# Patient Record
Sex: FEMALE | Employment: UNEMPLOYED | URBAN - METROPOLITAN AREA
[De-identification: names, ages, dates, MRNs, and addresses within clinical notes are randomized per-mention and may not be internally consistent; named-entity substitution may affect disease eponyms.]

---

## 2020-05-14 LAB — GRBS, EXTERNAL: NORMAL

## 2020-11-05 LAB
ANTIBODY SCREEN, EXTERNAL: NORMAL
CHLAMYDIA, EXTERNAL: NORMAL
HBSAG, EXTERNAL: NORMAL
HIV, EXTERNAL: NORMAL
N. GONORRHEA, EXTERNAL: NORMAL
RPR, EXTERNAL: NORMAL
RUBELLA, EXTERNAL: NORMAL
TYPE, ABO & RH, EXTERNAL: NORMAL

## 2021-05-15 ENCOUNTER — HOSPITAL ENCOUNTER (INPATIENT)
Age: 26
LOS: 2 days | Discharge: HOME | End: 2021-05-17
Attending: OBSTETRICS & GYNECOLOGY | Admitting: OBSTETRICS & GYNECOLOGY
Payer: OTHER GOVERNMENT

## 2021-05-15 PROBLEM — O99.119 THROMBOCYTOPENIA AFFECTING PREGNANCY, ANTEPARTUM (HCC): Status: ACTIVE | Noted: 2021-05-15

## 2021-05-15 PROBLEM — O13.9 GESTATIONAL HYPERTENSION AFFECTING THIRD PREGNANCY: Status: ACTIVE | Noted: 2021-05-15

## 2021-05-15 PROBLEM — D69.6 THROMBOCYTOPENIA AFFECTING PREGNANCY, ANTEPARTUM (HCC): Status: ACTIVE | Noted: 2021-05-15

## 2021-05-15 PROBLEM — Z3A.39 39 WEEKS GESTATION OF PREGNANCY: Status: ACTIVE | Noted: 2021-05-15

## 2021-05-15 LAB
ABO + RH BLD: NORMAL
ALBUMIN SERPL-MCNC: 2.9 G/DL (ref 3.4–5)
ALBUMIN/GLOB SERPL: 0.9 {RATIO} (ref 0.8–1.7)
ALP SERPL-CCNC: 138 U/L (ref 45–117)
ALT SERPL-CCNC: 11 U/L (ref 13–56)
ANION GAP SERPL CALC-SCNC: 6 MMOL/L (ref 3–18)
APPEARANCE UR: NORMAL
AST SERPL-CCNC: 12 U/L (ref 10–38)
BILIRUB SERPL-MCNC: 0.3 MG/DL (ref 0.2–1)
BILIRUB UR QL: NEGATIVE
BLOOD GROUP ANTIBODIES SERPL: NORMAL
BUN SERPL-MCNC: 6 MG/DL (ref 7–18)
BUN/CREAT SERPL: 11 (ref 12–20)
CALCIUM SERPL-MCNC: 9 MG/DL (ref 8.5–10.1)
CHLORIDE SERPL-SCNC: 106 MMOL/L (ref 100–111)
CO2 SERPL-SCNC: 25 MMOL/L (ref 21–32)
COLOR UR: NORMAL
CREAT SERPL-MCNC: 0.53 MG/DL (ref 0.6–1.3)
ERYTHROCYTE [DISTWIDTH] IN BLOOD BY AUTOMATED COUNT: 14.6 % (ref 11.6–14.5)
GLOBULIN SER CALC-MCNC: 3.4 G/DL (ref 2–4)
GLUCOSE SERPL-MCNC: 87 MG/DL (ref 74–99)
GLUCOSE UR QL STRIP.AUTO: NEGATIVE MG/DL
HCT VFR BLD AUTO: 37.9 % (ref 35–45)
HGB BLD-MCNC: 12.2 G/DL (ref 12–16)
KETONES UR-MCNC: NEGATIVE MG/DL
LDH SERPL L TO P-CCNC: 184 U/L (ref 81–234)
LEUKOCYTE ESTERASE UR QL STRIP: NEGATIVE
MCH RBC QN AUTO: 27.2 PG (ref 24–34)
MCHC RBC AUTO-ENTMCNC: 32.2 G/DL (ref 31–37)
MCV RBC AUTO: 84.6 FL (ref 74–97)
NITRITE UR QL: NEGATIVE
PH UR: 7 [PH] (ref 5–9)
PLATELET # BLD AUTO: 146 K/UL (ref 135–420)
PMV BLD AUTO: 12 FL (ref 9.2–11.8)
POTASSIUM SERPL-SCNC: 3.7 MMOL/L (ref 3.5–5.5)
PROT SERPL-MCNC: 6.3 G/DL (ref 6.4–8.2)
PROT UR QL: NEGATIVE MG/DL
RBC # BLD AUTO: 4.48 M/UL (ref 4.2–5.3)
RBC # UR STRIP: NEGATIVE /UL
SERVICE CMNT-IMP: NORMAL
SODIUM SERPL-SCNC: 137 MMOL/L (ref 136–145)
SP GR UR: 1.02 (ref 1–1.02)
SPECIMEN EXP DATE BLD: NORMAL
URATE SERPL-MCNC: 4.2 MG/DL (ref 2.6–7.2)
UROBILINOGEN UR QL: 0.2 EU/DL (ref 0.2–1)
WBC # BLD AUTO: 8.5 K/UL (ref 4.6–13.2)

## 2021-05-15 PROCEDURE — 85027 COMPLETE CBC AUTOMATED: CPT

## 2021-05-15 PROCEDURE — 65270000029 HC RM PRIVATE

## 2021-05-15 PROCEDURE — 74011250636 HC RX REV CODE- 250/636: Performed by: ADVANCED PRACTICE MIDWIFE

## 2021-05-15 PROCEDURE — 81003 URINALYSIS AUTO W/O SCOPE: CPT

## 2021-05-15 PROCEDURE — 59025 FETAL NON-STRESS TEST: CPT

## 2021-05-15 PROCEDURE — 83615 LACTATE (LD) (LDH) ENZYME: CPT

## 2021-05-15 PROCEDURE — 36415 COLL VENOUS BLD VENIPUNCTURE: CPT

## 2021-05-15 PROCEDURE — 80053 COMPREHEN METABOLIC PANEL: CPT

## 2021-05-15 PROCEDURE — 99284 EMERGENCY DEPT VISIT MOD MDM: CPT

## 2021-05-15 PROCEDURE — 84550 ASSAY OF BLOOD/URIC ACID: CPT

## 2021-05-15 PROCEDURE — 86901 BLOOD TYPING SEROLOGIC RH(D): CPT

## 2021-05-15 RX ORDER — BUTORPHANOL TARTRATE 2 MG/ML
2 INJECTION INTRAMUSCULAR; INTRAVENOUS
Status: DISCONTINUED | OUTPATIENT
Start: 2021-05-15 | End: 2021-05-16 | Stop reason: HOSPADM

## 2021-05-15 RX ORDER — NALBUPHINE HYDROCHLORIDE 10 MG/ML
10 INJECTION, SOLUTION INTRAMUSCULAR; INTRAVENOUS; SUBCUTANEOUS
Status: DISCONTINUED | OUTPATIENT
Start: 2021-05-15 | End: 2021-05-16 | Stop reason: HOSPADM

## 2021-05-15 RX ORDER — METHYLERGONOVINE MALEATE 0.2 MG/ML
0.2 INJECTION INTRAVENOUS AS NEEDED
Status: DISCONTINUED | OUTPATIENT
Start: 2021-05-15 | End: 2021-05-16 | Stop reason: HOSPADM

## 2021-05-15 RX ORDER — MINERAL OIL
30 OIL (ML) ORAL AS NEEDED
Status: DISCONTINUED | OUTPATIENT
Start: 2021-05-15 | End: 2021-05-16 | Stop reason: HOSPADM

## 2021-05-15 RX ORDER — OXYTOCIN/RINGER'S LACTATE 30/500 ML
10 PLASTIC BAG, INJECTION (ML) INTRAVENOUS AS NEEDED
Status: DISCONTINUED | OUTPATIENT
Start: 2021-05-15 | End: 2021-05-15 | Stop reason: SDUPTHER

## 2021-05-15 RX ORDER — OXYTOCIN/RINGER'S LACTATE 30/500 ML
87.3 PLASTIC BAG, INJECTION (ML) INTRAVENOUS AS NEEDED
Status: DISCONTINUED | OUTPATIENT
Start: 2021-05-15 | End: 2021-05-15 | Stop reason: SDUPTHER

## 2021-05-15 RX ORDER — VITAMIN A ACETATE, BETA CAROTENE, ASCORBIC ACID, CHOLECALCIFEROL, .ALPHA.-TOCOPHEROL ACETATE, DL-, THIAMINE MONONITRATE, RIBOFLAVIN, NIACINAMIDE, PYRIDOXINE HYDROCHLORIDE, FOLIC ACID, CYANOCOBALAMIN, CALCIUM CARBONATE, FERROUS FUMARATE, ZINC OXIDE, CUPRIC OXIDE 3080; 12; 120; 400; 1; 1.84; 3; 20; 22; 920; 25; 200; 27; 10; 2 [IU]/1; UG/1; MG/1; [IU]/1; MG/1; MG/1; MG/1; MG/1; MG/1; [IU]/1; MG/1; MG/1; MG/1; MG/1; MG/1
1 TABLET, FILM COATED ORAL DAILY
COMMUNITY

## 2021-05-15 RX ORDER — OXYTOCIN/0.9 % SODIUM CHLORIDE 30/500 ML
87.3 PLASTIC BAG, INJECTION (ML) INTRAVENOUS AS NEEDED
Status: DISCONTINUED | OUTPATIENT
Start: 2021-05-15 | End: 2021-05-16 | Stop reason: HOSPADM

## 2021-05-15 RX ORDER — TERBUTALINE SULFATE 1 MG/ML
0.25 INJECTION SUBCUTANEOUS
Status: DISCONTINUED | OUTPATIENT
Start: 2021-05-15 | End: 2021-05-16 | Stop reason: HOSPADM

## 2021-05-15 RX ORDER — OXYTOCIN/0.9 % SODIUM CHLORIDE 30/500 ML
0-20 PLASTIC BAG, INJECTION (ML) INTRAVENOUS
Status: DISCONTINUED | OUTPATIENT
Start: 2021-05-15 | End: 2021-05-17 | Stop reason: HOSPADM

## 2021-05-15 RX ORDER — HYDROMORPHONE HYDROCHLORIDE 1 MG/ML
1 INJECTION, SOLUTION INTRAMUSCULAR; INTRAVENOUS; SUBCUTANEOUS
Status: DISCONTINUED | OUTPATIENT
Start: 2021-05-15 | End: 2021-05-16 | Stop reason: HOSPADM

## 2021-05-15 RX ORDER — ONDANSETRON 2 MG/ML
4 INJECTION INTRAMUSCULAR; INTRAVENOUS
Status: DISCONTINUED | OUTPATIENT
Start: 2021-05-15 | End: 2021-05-16 | Stop reason: HOSPADM

## 2021-05-15 RX ORDER — OXYTOCIN/RINGER'S LACTATE 30/500 ML
10 PLASTIC BAG, INJECTION (ML) INTRAVENOUS AS NEEDED
Status: DISCONTINUED | OUTPATIENT
Start: 2021-05-15 | End: 2021-05-16 | Stop reason: HOSPADM

## 2021-05-15 RX ORDER — LIDOCAINE HYDROCHLORIDE 10 MG/ML
20 INJECTION, SOLUTION EPIDURAL; INFILTRATION; INTRACAUDAL; PERINEURAL AS NEEDED
Status: DISCONTINUED | OUTPATIENT
Start: 2021-05-15 | End: 2021-05-16 | Stop reason: HOSPADM

## 2021-05-15 RX ORDER — SODIUM CHLORIDE, SODIUM LACTATE, POTASSIUM CHLORIDE, CALCIUM CHLORIDE 600; 310; 30; 20 MG/100ML; MG/100ML; MG/100ML; MG/100ML
125 INJECTION, SOLUTION INTRAVENOUS CONTINUOUS
Status: DISCONTINUED | OUTPATIENT
Start: 2021-05-15 | End: 2021-05-16 | Stop reason: HOSPADM

## 2021-05-15 RX ADMIN — Medication 2 MILLI-UNITS/MIN: at 17:01

## 2021-05-15 RX ADMIN — SODIUM CHLORIDE, SODIUM LACTATE, POTASSIUM CHLORIDE, AND CALCIUM CHLORIDE 125 ML/HR: 600; 310; 30; 20 INJECTION, SOLUTION INTRAVENOUS at 17:00

## 2021-05-15 NOTE — H&P
History & Physical    Name: Domtiila Green MRN: [id number]  SSN: [id number]    YOB: 1995  Age: 27 y.o. Sex: female        Subjective:     Estimated Date of Delivery: 21  OB History    Para Term  AB Living   3 2   2   2   SAB TAB Ectopic Molar Multiple Live Births             2      # Outcome Date GA Lbr Dani/2nd Weight Sex Delivery Anes PTL Lv   3 Current            2  20     Vag-Spont   AYDE   1  16     Vag-Spont   AYDE       Ms. Orion Dutta is admitted with pregnancy at 39w6d for induction of labor. Prenatal course was complicated by hx of  delivery x2, limited prenatal care and gestational hypertension. Please see prenatal records for details. Past Medical History:   Diagnosis Date    Asthma     exercise induced asthma-last used inhaler 9 yrs ago    Essential hypertension     yesterday at office     History reviewed. No pertinent surgical history. Social History     Occupational History    Not on file   Tobacco Use    Smoking status: Never Smoker    Smokeless tobacco: Never Used   Substance and Sexual Activity    Alcohol use: Never     Frequency: Never    Drug use: Never    Sexual activity: Not on file     Family History   Problem Relation Age of Onset    Cancer Paternal Grandmother     Diabetes Paternal Grandmother     Hypertension Paternal Grandmother        No Known Allergies  Prior to Admission medications    Medication Sig Start Date End Date Taking? Authorizing Provider   prenatal vit-calcium-iron-fa (Prenatal Plus, calcium carb,) 27 mg iron- 1 mg tab Take 1 Tab by mouth daily. Indications: pregnancy   Yes Provider, Historical        Review of Systems: A comprehensive review of systems was negative except for that written in the HPI.     Objective:     Vitals:  Vitals:    05/15/21 1430 05/15/21 1440 05/15/21 1500 05/15/21 1520   BP: (!) 159/103 121/78 125/78 136/79   Pulse: 94 90 88 85   Resp:       Temp:       Weight: Height:            Physical Exam:  Patient without distress. Membranes:  Intact  Fetal Heart Rate: Baseline: 130 per minute  Variability: moderate  Accelerations: yes  Decelerations: none  Uterine contractions: irregular    Prenatal Labs:   No results found for: ABORH, RUBELLAEXT, GRBSEXT, HBSAGEXT, HIVEXT, RPREXT, GONNOEXT, CHLAMEXT, ABORHEXT, RUBELLAEXT, GRBSEXT, HBSAGEXT, HIVEXT, RPREXT, GONNOEXT, CHLAMEXT      Assessment/Plan:     Active Problems:    39 weeks gestation of pregnancy (5/15/2021)      Thrombocytopenia affecting pregnancy, antepartum ([facility] [address] (5/15/2021)      Gestational hypertension affecting third pregnancy (5/15/2021)         Plan: Admit for Reassuring fetal status, Continue plan for vaginal delivery, Plan for IOL with either cook balloon or pitocin per protocol pending cervical exam.  Group B Strep was tested 5/15/21; results not available at this time.

## 2021-05-15 NOTE — PROGRESS NOTES
Labor Progress Note  Patient seen, fetal heart rate and contraction pattern evaluated, patient examined. Patient Vitals for the past 1 hrs:   BP Pulse   05/15/21 1815 130/80 91   05/15/21 1730 118/73 84       Physical Exam:  Cervical Exam:  5 cm dilated    50% effaced    -3 station    Presenting Part: cephalic-confirmed with limited BSUS  Cervical Position: posterior  Consistency: Soft  Membranes:  BBOW  Uterine Activity: irregular contractions  Fetal Heart Rate: Baseline: 140 per minute  Variability: moderate  Accelerations: yes  Decelerations: none  Uterine contractions: irregular    Assessment/Plan:  Reassuring fetal status, plan of care discussed woth patient to start pitocin augmentation.  Patient in agreement with POC

## 2021-05-15 NOTE — PROGRESS NOTES
Pt wants to go natural- has done so for both of her 2 previous vag deliveries. Currently denies feeling contractions, is on birthing ball.

## 2021-05-15 NOTE — PROGRESS NOTES
Problem: Vaginal Delivery: Day of Deliver-Laboring  Goal: Diagnostic Test/Procedures  Outcome: Progressing Towards Goal  Goal: Discharge Planning  Outcome: Progressing Towards Goal  Goal: Respiratory  Outcome: Progressing Towards Goal  Goal: Treatments/Interventions/Procedures  Outcome: Progressing Towards Goal  Goal: *Vital signs within defined limits  Outcome: Progressing Towards Goal  Goal: *Labs within defined limits  Outcome: Progressing Towards Goal  Goal: *Hemodynamically stable  Outcome: Progressing Towards Goal  Goal: *Optimal pain control at patient's stated goal  Outcome: Progressing Towards Goal     Problem: Patient Education: Go to Patient Education Activity  Goal: Patient/Family Education  Outcome: Progressing Towards Goal     Problem: Vaginal Delivery: Day of Deliver-Laboring  Goal: Off Pathway (Use only if patient is Off Pathway)  Outcome: Progressing Towards Goal  Goal: Activity/Safety  Outcome: Progressing Towards Goal  Goal: Consults, if ordered  Outcome: Progressing Towards Goal  Goal: Diagnostic Test/Procedures  Outcome: Progressing Towards Goal  Goal: Nutrition/Diet  Outcome: Progressing Towards Goal  Goal: Discharge Planning  Outcome: Progressing Towards Goal  Goal: Medications  Outcome: Progressing Towards Goal  Goal: Respiratory  Outcome: Progressing Towards Goal  Goal: Treatments/Interventions/Procedures  Outcome: Progressing Towards Goal  Goal: *Vital signs within defined limits  Outcome: Progressing Towards Goal  Goal: *Labs within defined limits  Outcome: Progressing Towards Goal  Goal: *Hemodynamically stable  Outcome: Progressing Towards Goal  Goal: *Optimal pain control at patient's stated goal  Outcome: Progressing Towards Goal     Problem: Pain  Goal: *Control of Pain  Outcome: Progressing Towards Goal     Problem: Patient Education: Go to Patient Education Activity  Goal: Patient/Family Education  Outcome: Progressing Towards Goal

## 2021-05-15 NOTE — PROGRESS NOTES
1330- Patient presents to labor and delivery  39weeks 6days for NST and lab work, patient was seen in the office yesterday and had elevated blood pressure, patient was told to come to labor and delivery but had to coordinate  and was unable to come until today. TOCO and ultrasound applied to abdomin, abdomin soft to palpate    1408- Lab at bedside    1509- patient report given to NADIYA NegronRN

## 2021-05-16 PROCEDURE — 65270000029 HC RM PRIVATE

## 2021-05-16 PROCEDURE — 74011250637 HC RX REV CODE- 250/637: Performed by: MIDWIFE

## 2021-05-16 PROCEDURE — 77010026065 HC OXYGEN MINIMUM MEDICAL AIR

## 2021-05-16 PROCEDURE — 75410000000 HC DELIVERY VAGINAL/SINGLE

## 2021-05-16 PROCEDURE — 75410000002 HC LABOR FEE PER 1 HR

## 2021-05-16 PROCEDURE — 75410000003 HC RECOV DEL/VAG/CSECN EA 0.5 HR

## 2021-05-16 PROCEDURE — 4A1HXCZ MONITORING OF PRODUCTS OF CONCEPTION, CARDIAC RATE, EXTERNAL APPROACH: ICD-10-PCS | Performed by: OBSTETRICS & GYNECOLOGY

## 2021-05-16 RX ORDER — ZOLPIDEM TARTRATE 5 MG/1
5 TABLET ORAL
Status: DISCONTINUED | OUTPATIENT
Start: 2021-05-16 | End: 2021-05-17 | Stop reason: HOSPADM

## 2021-05-16 RX ORDER — ACETAMINOPHEN 325 MG/1
650 TABLET ORAL
Status: DISCONTINUED | OUTPATIENT
Start: 2021-05-16 | End: 2021-05-17 | Stop reason: HOSPADM

## 2021-05-16 RX ORDER — IBUPROFEN 400 MG/1
800 TABLET ORAL
Status: DISCONTINUED | OUTPATIENT
Start: 2021-05-16 | End: 2021-05-17 | Stop reason: HOSPADM

## 2021-05-16 RX ORDER — OXYCODONE AND ACETAMINOPHEN 5; 325 MG/1; MG/1
1 TABLET ORAL
Status: DISCONTINUED | OUTPATIENT
Start: 2021-05-16 | End: 2021-05-17 | Stop reason: HOSPADM

## 2021-05-16 RX ORDER — PROMETHAZINE HYDROCHLORIDE 25 MG/ML
25 INJECTION, SOLUTION INTRAMUSCULAR; INTRAVENOUS
Status: DISCONTINUED | OUTPATIENT
Start: 2021-05-16 | End: 2021-05-17 | Stop reason: HOSPADM

## 2021-05-16 RX ORDER — AMOXICILLIN 250 MG
1 CAPSULE ORAL
Status: DISCONTINUED | OUTPATIENT
Start: 2021-05-16 | End: 2021-05-17 | Stop reason: HOSPADM

## 2021-05-16 RX ADMIN — IBUPROFEN 800 MG: 400 TABLET, FILM COATED ORAL at 11:43

## 2021-05-16 RX ADMIN — IBUPROFEN 800 MG: 400 TABLET, FILM COATED ORAL at 19:20

## 2021-05-16 RX ADMIN — DOCUSATE SODIUM 50 MG AND SENNOSIDES 8.6 MG 1 TABLET: 8.6; 5 TABLET, FILM COATED ORAL at 20:01

## 2021-05-16 RX ADMIN — OXYCODONE HYDROCHLORIDE AND ACETAMINOPHEN 1 TABLET: 5; 325 TABLET ORAL at 16:04

## 2021-05-16 NOTE — PROGRESS NOTES
1825: Bedside and verbal shift change report given to ZHEN Marley RN by Cristine Cifuentes RN. Assumed care of pt at this time. 1905: Bedside and verbal shift change report given by Deonna Sandoval RN to Jose Nicholson RN. Relinquished care of pt at this time.

## 2021-05-16 NOTE — L&D DELIVERY NOTE
Delivery Summary    Patient: Shilpa Perdomo MRN: [id number]  SSN: [id number]    YOB: 1995  Age: 27 y.o. Sex: female       Information for the patient's :  Rina Parsons [[id number]]       Labor Events:    Labor: No    Steroids: None   Cervical Ripening Date/Time:       Cervical Ripening Type: None   Antibiotics During Labor:     Rupture Identifier:      Rupture Date/Time: 5/15/2021 10:50 PM   Rupture Type: Bulging;SROM   Amniotic Fluid Volume:  Moderate    Amniotic Fluid Description: Clear    Amniotic Fluid Odor: None    Induction: Oxytocin       Induction Date/Time:        Indications for Induction: Gestational Hypertension    Augmentation:     Augmentation Date/Time:      Indications for Augmentation:     Labor complications: Shoulder Dystocia       Additional complications:        Delivery Events:  Indications For Episiotomy:     Episiotomy: None   Perineal Laceration(s): None   Repaired:     Periurethral Laceration Location:      Repaired:     Labial Laceration Location:     Repaired:     Sulcal Laceration Location:     Repaired:     Vaginal Laceration Location:     Repaired:     Cervical Laceration Location:     Repaired:     Repair Suture: None   Number of Repair Packets:     Estimated Blood Loss (ml): 250ml   Quantitative Blood Loss (ml)                Delivery Date: 2021    Delivery Time: 1:38 AM  Delivery Type: Vaginal, Spontaneous  Sex:  Male    Gestational Age: 37w0d   Delivery Clinician:  Silver De Guzman  Living Status: Living   Delivery Location: L&D            APGARS  One minute Five minutes Ten minutes   Skin color: 1   1        Heart rate: 2   2        Grimace: 1   2        Muscle tone: 1   2        Breathin   2        Totals: 6   9            Presentation: Vertex    Position:   Occiput Anterior  Resuscitation Method:  Tactile Stimulation;Suctioning-bulb     Meconium Stained: None      Cord Information: 3 Vessels  Complications: Nuchal Cord With Compressions  Cord around: head  Delayed cord clamping? Yes  Cord clamped date/time:2021  1:38 AM  Disposition of Cord Blood: Lab    Blood Gases Sent?: Yes    Placenta:  Date/Time:    Removal: Spontaneous      Appearance: Normal      Measurements:  Birth Weight:        Birth Length:        Head Circumference:        Chest Circumference:       Abdominal Girth: Other Providers:   ;Deena KING;;;;;DEARMAN, Colin Fabry, Obstetrician;Primary Nurse;Primary  Nurse;Nicu Nurse;Neonatologist;Anesthesiologist;Crna;Nurse Practitioner;Midwife;Nursery Nurse;Staff Nurse           Group B Strep:   Lab Results   Component Value Date/Time    GrBStrep, External unknown 2020     Information for the patient's :  Sanjuana Wright [[id number]]   No results found for: ABORH, PCTABR, PCTDIG, BILI, ABORHEXT, ABORH     No results for input(s): PCO2CB, PO2CB, HCO3I, SO2I, IBD, PTEMPI, SPECTI, PHICB, ISITE, IDEV, IALLEN in the last 72 hours. Presented to room for patient with urge to push. SVE c/c/+2. Room prepared for delivery. Strong maternal pushing effort fetal head delivered to OA, restituted to ROT, turtle sign noted, attempted one push, shoulder did not easily follow. Lakisha maneuver performed, mother gave 1 strong push, shoulder did not deliver, shoulder dystocia called meño requested at bedside. Attempted woodscrew counterclockwise, unsuccessful, attempted to reach for posterior arm, nuchal cord noted, easily reduced, attempted to push with patient, shoulder did not release, woodscrew clockwise to put baby into transverse plane, and infant body was delivered with next push. Infant placed on maternal abdomen, 90 second shoulder total. Cord clamped and cut by this CNM then baby taken to radiant warmer for evaluation by awaiting NNP. Cord gases collected by this CNM. Pitocin started for active third stage management.  Placenta delivered spontaneously, [place] 3vc, apparently intact. Perineum inspected, small abrasion noted, hemostatic after applying pressure, no repair necessary. Fundus firm, bleeding small. Counts correct x2. Mom and baby left bonding skin to skin in stable condition.

## 2021-05-16 NOTE — PROGRESS NOTES
8416 Bedside and Verbal shift change report given to Shirlene Dumont  (oncoming nurse) by Caroline Bess RN (offgoing nurse). Report included the following information SBAR, Kardex, Procedure Summary, Intake/Output, MAR and Recent Results. 9980 Patient up to bathroom for 2nd time postpartum. Independently performed louis care. Ambulated back to bed. Tolerated activity well.  1600 Have been assisting patient with breastfeeding today. Nipple shield given since nipples are inverted and no not jeremy with pinch test. Patient also using her own latch assist device to draw out nipples prior to breastfeeding. Infant can be heard swallowing at breast when nursing. Pain has been well controlled per patient report with medication administration, and patient seems to be recovering well from delivery and bonding well with infant. [phone] telephone SBAR report given to Gillian. Patient transferred to postpartum room 242.

## 2021-05-16 NOTE — PROGRESS NOTES
Care plan note: Patient reports pain control with use of medications and cold pack to perineum. Breastfeeding is going well with use of latch assist and nipple shield. Postpartum lochia and fundal checks WNL. Vitals stable. Patient appears to be bonding well with infant.      Problem: Vaginal Delivery: Day of Deliver-Laboring  Goal: Diagnostic Test/Procedures  Outcome: Resolved/Met  Goal: Discharge Planning  Outcome: Resolved/Met  Goal: Respiratory  Outcome: Resolved/Met  Goal: Treatments/Interventions/Procedures  Outcome: Resolved/Met  Goal: *Vital signs within defined limits  Outcome: Resolved/Met  Goal: *Labs within defined limits  Outcome: Resolved/Met  Goal: *Hemodynamically stable  Outcome: Resolved/Met  Goal: *Optimal pain control at patient's stated goal  Outcome: Resolved/Met     Problem: Patient Education: Go to Patient Education Activity  Goal: Patient/Family Education  Outcome: Progressing Towards Goal     Problem: Vaginal Delivery: Day of Deliver-Laboring  Goal: Off Pathway (Use only if patient is Off Pathway)  Outcome: Resolved/Met  Goal: Activity/Safety  Outcome: Resolved/Met  Goal: Consults, if ordered  Outcome: Resolved/Met  Goal: Diagnostic Test/Procedures  Outcome: Resolved/Met  Goal: Nutrition/Diet  Outcome: Resolved/Met  Goal: Discharge Planning  Outcome: Resolved/Met  Goal: Medications  Outcome: Resolved/Met  Goal: Respiratory  Outcome: Resolved/Met  Goal: Treatments/Interventions/Procedures  Outcome: Resolved/Met  Goal: *Vital signs within defined limits  Outcome: Resolved/Met  Goal: *Labs within defined limits  Outcome: Resolved/Met  Goal: *Hemodynamically stable  Outcome: Resolved/Met  Goal: *Optimal pain control at patient's stated goal  Outcome: Resolved/Met     Problem: Vaginal Delivery: Day of Delivery-Post delivery  Goal: Off Pathway (Use only if patient is Off Pathway)  Outcome: Progressing Towards Goal  Goal: Activity/Safety  Outcome: Progressing Towards Goal  Goal: Consults, if ordered  Outcome: Progressing Towards Goal  Goal: Nutrition/Diet  Outcome: Progressing Towards Goal  Goal: Discharge Planning  Outcome: Progressing Towards Goal  Goal: Medications  Outcome: Progressing Towards Goal  Goal: Treatments/Interventions/Procedures  Outcome: Progressing Towards Goal  Goal: *Vital signs within defined limits  Outcome: Progressing Towards Goal  Goal: *Labs within defined limits  Outcome: Progressing Towards Goal  Goal: *Hemodynamically stable  Outcome: Progressing Towards Goal  Goal: *Optimal pain control at patient's stated goal  Outcome: Progressing Towards Goal  Goal: *Participates in infant care  Outcome: Progressing Towards Goal  Goal: *Demonstrates progressive activity  Outcome: Progressing Towards Goal  Goal: *Tolerating diet  Outcome: Progressing Towards Goal     Problem: Pain  Goal: *Control of Pain  Outcome: Progressing Towards Goal     Problem: Patient Education: Go to Patient Education Activity  Goal: Patient/Family Education  Outcome: Progressing Towards Goal

## 2021-05-16 NOTE — PROGRESS NOTES
[id number]: Patient actively pushing during contractions. This RN and provider remain at bedside providing continuous labor support, continuously assessing patient, vital signs, fetal heart rate, contraction pattern, progress of labor and descent, adjusting EFM and TOCO and palpating abdomen and contractions as needed. 0145: Louis care and teaching provided. Ice louis pad, pad, bed pad, gown changed. Patient instructed to call for assistance prior to getting up out of bed. Call bell within reach. Patient provided with ice water, juice, crackers, and menu with instructions for ordering. 65: Louis care and teaching provided. Ice louis pad, pad, bed pad, gown changed. Patient instructed to call for assistance prior to getting up out of bed. Call bell within reach. Patient provided with ice water, juice, crackers, and menu with instructions for ordering.

## 2021-05-16 NOTE — PROGRESS NOTES
1905: Bedside and Verbal shift change report given to Magali Mooney, RN (oncoming nurse) by Kat Morley RN (offgoing nurse). Report included the following information SBAR, Kardex, Procedure Summary, Intake/Output, MAR and Recent Results. 5433: Bedside and Verbal shift change report given to AISHA Candelario (oncoming nurse) by Magali Mooney RN (offgoing nurse). Report included the following information SBAR, Kardex, Procedure Summary, Intake/Output, MAR and Recent Results.

## 2021-05-17 VITALS
HEIGHT: 66 IN | WEIGHT: 240 LBS | BODY MASS INDEX: 38.57 KG/M2 | SYSTOLIC BLOOD PRESSURE: 121 MMHG | RESPIRATION RATE: 16 BRPM | OXYGEN SATURATION: 100 % | TEMPERATURE: 98.1 F | DIASTOLIC BLOOD PRESSURE: 82 MMHG | HEART RATE: 75 BPM

## 2021-05-17 LAB
HCT VFR BLD AUTO: 34.3 % (ref 35–45)
HGB BLD-MCNC: 10.8 G/DL (ref 12–16)

## 2021-05-17 PROCEDURE — 74011250637 HC RX REV CODE- 250/637: Performed by: MIDWIFE

## 2021-05-17 PROCEDURE — 85018 HEMOGLOBIN: CPT

## 2021-05-17 PROCEDURE — 36415 COLL VENOUS BLD VENIPUNCTURE: CPT

## 2021-05-17 RX ORDER — IBUPROFEN 800 MG/1
800 TABLET ORAL
Qty: 90 TAB | Refills: 2 | Status: SHIPPED | OUTPATIENT
Start: 2021-05-17

## 2021-05-17 RX ADMIN — OXYCODONE HYDROCHLORIDE AND ACETAMINOPHEN 1 TABLET: 5; 325 TABLET ORAL at 11:00

## 2021-05-17 RX ADMIN — DOCUSATE SODIUM 50 MG AND SENNOSIDES 8.6 MG 1 TABLET: 8.6; 5 TABLET, FILM COATED ORAL at 11:06

## 2021-05-17 NOTE — LACTATION NOTE
This note was copied from a baby's chart. 56 per mom, infant latching and nursing well. Breastfeeding discharge teaching completed to include feeding on demand, foremilk and hindmilk importance, engorgement, mastitis, clogged ducts, pumping, breastmilk storage, and returning to work. Information given about unit and office phone numbers and encouraged mom to reach out if concerns arise, but that [facility] would be calling her in the next few days to follow up on breastfeeding. Mom verbalized understanding and no questions at this time.

## 2021-05-17 NOTE — PROGRESS NOTES
0715 Bedside and Verbal shift change report given to Trae Appiah (oncoming nurse) by Jesus Duran (offgoing nurse). Report included the following information SBAR, Kardex, Procedure Summary, Intake/Output, MAR and Recent Results. 0730 pt noted sleeping; no distress observed    0810 SDanielsCNM notified of b/p and VS    0845 pt dozing. Arouses easily for assessment    1045 pt dozing at this time    1100 pt awake; c/o pain; Percocet given for c/o    1105 colace given per pt request    1325 Pt flako meal; resting states is comfortable at this time    1420 No needs verbalized when questioned    1620 Assessment done - see flowsheets    1730 Pt resting; no c/o verbalized. Discussed discharge plans with pt. Waiting for d/c for infant     1830 [address] notified - d/c ok for pt.    1910 Bedside and Verbal shift change report given to [facility] (oncoming nurse) by Trae Appiah (offgoing nurse). Report included the following information SBAR, Kardex, Procedure Summary, Intake/Output, MAR and Recent Results.

## 2021-05-17 NOTE — DISCHARGE INSTRUCTIONS
POST DELIVERY DISCHARGE INSTRUCTIONS    Name: Young Acuña  YOB: 1995  Primary Diagnosis: Active Problems:    44 weeks gestation of pregnancy (5/15/2021)      Thrombocytopenia affecting pregnancy, antepartum ([address] (5/15/2021)      Gestational hypertension affecting third pregnancy (5/15/2021)      General:     Diet/Diet Restrictions:  Eight 8-ounce glasses of fluid daily (water, juices); avoid excessive caffeine intake. Meals/snacks as desired which are high in fiber and carbohydrates and low in fat and cholesterol. Physical Activity / Restrictions / Safety:     Avoid heavy lifting, no more that 8 lbs. For 2-3 weeks; limit use of stairs to 2 times daily for the first week home. No driving for one week. Avoid intercourse 4-6 weeks, no douching or tampon use. Check with obstetrician before starting or resuming an exercise program.       Discharge Instructions/Special Treatment/Home Care Needs:     Continue prenatal vitamins. Continue to use squirt bottle with warm water on your episiotomy after each bathroom use until bleeding stops. If steri-strips applied to your incision, remove in 7-10 days. Call your doctor for the following:     Fever over 101 degrees by mouth. Vaginal bleeding heavier than a normal menstrual period or clot larger than a golf ball. Red streaks or increased swelling of legs, painful red streaks on your breast.  Painful urination, constipation and increased pain or swelling or discharge with your incision. If you feel extremely anxious or overwhelmed. If you have thoughts of harming yourself and/or your baby. Pain Management:     Pain Management:   Take Acetaminophen (Tylenol) or Ibuprofen (Advil, Motrin), as directed for pain. Use a warm Sitz bath 3 times daily to relieve episiotomy or hemorrhoidal discomfort. Heating pad to  incision as needed. For hemorrhoidal discomfort, use Tucks and Anusol cream as needed and directed.     Follow-Up Care: These are general instructions for a healthy lifestyle:    No smoking/ No tobacco products/ Avoid exposure to second hand smoke    Surgeon General's Warning:  Quitting smoking now greatly reduces serious risk to your health. Obesity, smoking, and sedentary lifestyle greatly increases your risk for illness    A healthy diet, regular physical exercise & weight monitoring are important for maintaining a healthy lifestyle    Recognize signs and symptoms of STROKE:    F-face looks uneven    A-arms unable to move or move unevenly    S-speech slurred or non-existent    T-time-call 911 as soon as signs and symptoms begin-DO NOT go       Back to bed or wait to see if you get better-TIME IS BRAIN.

## 2021-05-17 NOTE — PROGRESS NOTES
Problem: Vaginal Delivery: Postpartum Day 1  Goal: Activity/Safety  Outcome: Resolved/Met  Goal: Consults, if ordered  Outcome: Resolved/Met  Goal: Diagnostic Test/Procedures  Outcome: Resolved/Met  Goal: Nutrition/Diet  Outcome: Resolved/Met  Goal: Discharge Planning  Outcome: Resolved/Met  Goal: Medications  Outcome: Resolved/Met  Goal: Treatments/Interventions/Procedures  Outcome: Resolved/Met  Goal: Psychosocial  Outcome: Resolved/Met  Goal: *Vital signs within defined limits  Outcome: Resolved/Met  Goal: *Labs within defined limits  Outcome: Resolved/Met  Goal: *Hemodynamically stable  Outcome: Resolved/Met  Goal: *Optimal pain control at patient's stated goal  Outcome: Resolved/Met  Goal: *Participates in infant care  Outcome: Resolved/Met  Goal: *Demonstrates progressive activity  Outcome: Resolved/Met  Goal: *Performs self perineal care  Outcome: Resolved/Met  Goal: *Appropriate parent-infant bonding  Outcome: Resolved/Met  Goal: *Tolerating diet  Outcome: Resolved/Met

## 2021-05-17 NOTE — PROGRESS NOTES
Problem: Vaginal Delivery: Postpartum 2  Goal: Activity/Safety  Outcome: Progressing Towards Goal  Goal: Nutrition/Diet  Outcome: Progressing Towards Goal  Goal: Discharge Planning  Outcome: Progressing Towards Goal  Goal: Psychosocial  Outcome: Progressing Towards Goal

## 2021-05-17 NOTE — PROGRESS NOTES
Progress Note    Patient: Raissa Jose MRN: [id number]     YOB: 1995  Age: 27 y.o. Subjective:     Postpartum Day: 1    The patient is feeling well. Pain is  well controlled with current medications. Baby is feeding via breast without difficulty. Urinary output is adequate. Objective:      Patient Vitals for the past 8 hrs:   BP Temp Pulse Resp SpO2   21 0745 (!) 94/49 97.7 °F (36.5 °C) 74 17 100 %   21 0100 112/70 98.2 °F (36.8 °C) 87 18 100 %     General:    alert, cooperative, no distress   Lochia:  appropriate   Uterine Fundus:   firm @ umbilicus    Perineum:  Intact    DVT Evaluation:  No evidence of DVT seen on physical exam.     Lab/Data Review:  Recent Results (from the past 24 hour(s))   HGB & HCT    Collection Time: 21  5:40 AM   Result Value Ref Range    HGB 10.8 (L) 12.0 - 16.0 g/dL    HCT 34.3 (L) 35.0 - 45.0 %     All lab results for the last 24 hours reviewed. Assessment:     Delivery:  with shoulder dystocia    Plan:     Doing well postpartum vaginal delivery. Continue current postpartum care. Encouraged hydration, nutrition and ambulation. DC home tomorrow. Follow-up in office in 6 weeks. Call prn. Current Discharge Medication List      CONTINUE these medications which have NOT CHANGED    Details   prenatal vit-calcium-iron-fa (Prenatal Plus, calcium carb,) 27 mg iron- 1 mg tab Take 1 Tab by mouth daily. Indications: pregnancy             Reviewed education: S/sx of DVT, mastitis, and pp depression. Also reviewed reasons to call including DVT, issues with breasts, fever above 101, pp depression, increased bleeding (pad/hr) after rest. Included continuing good hydration at home, diet, rest when baby is sleeping, and pericare.      Signed By: Es Celestin CNM     May 17, 2021

## 2021-05-17 NOTE — DISCHARGE SUMMARY
Obstetrical Discharge Summary     Name: Cira Bowser MRN: [id number]  SSN: [id number]    YOB: 1995  Age: 27 y.o. Sex: female      Allergies: Patient has no known allergies. Admit Date: 5/15/2021    Discharge Date: 2021     Admitting Physician: Jaimee Pérez MD     Attending Physician:  Lorna Crook MD     * Admission Diagnoses: Thrombocytopenia affecting pregnancy, antepartum ([facility] [O99.119, D69.6]  39 weeks gestation of pregnancy [Z3A.39]  Gestational hypertension affecting third pregnancy [O13.9]    * Discharge Diagnoses:   Information for the patient's :  Seble Byrne [[id number]]   Delivery of a 4.085 kg male infant via Vaginal, Spontaneous on 2021 at 1:38 AM  by Ritu Burt. Apgars were 6  and 9 . Additional Diagnoses:   Hospital Problems as of 2021 Never Reviewed          Codes Class Noted - Resolved POA    39 weeks gestation of pregnancy ICD-10-CM: Z3A.39  ICD-9-CM: V22.2  5/15/2021 - Present Unknown        Thrombocytopenia affecting pregnancy, antepartum ([facility] ICD-10-CM: O99.119, D69.6  ICD-9-CM: 649.33, 287.5  5/15/2021 - Present Unknown        Gestational hypertension affecting third pregnancy ICD-10-CM: O13.9  ICD-9-CM: 642.30  5/15/2021 - Present Unknown             Lab Results   Component Value Date/Time    ABO/Rh(D) A POSITIVE 05/15/2021 02:14 PM    Rubella, External equivocal 2020    GrBStrep, External unknown 2020    ABO,Rh A pos 2020    There is no immunization history for the selected administration types on file for this patient. * Procedures:   * No surgery found *           * Discharge Condition: good    [facility] Course: Normal hospital course following the delivery.     * Disposition: Home    Discharge Medications:   Current Discharge Medication List      START taking these medications    Details   ibuprofen (MOTRIN) 800 mg tablet Take 1 Tab by mouth every eight (8) hours as needed (Pain scale 4-6).  Qty: 90 Tab, Refills: 2  Start date: 5/17/2021         CONTINUE these medications which have NOT CHANGED    Details   prenatal vit-calcium-iron-fa (Prenatal Plus, calcium carb,) 27 mg iron- 1 mg tab Take 1 Tab by mouth daily. Indications: pregnancy             * Follow-up Care/Patient Instructions: Activity: Activity as tolerated, No sex for 6 weeks, No driving while on analgesics and No heavy lifting for 6 weeks  Diet: Regular Diet  Wound Care: pericare as instructed by nursing staff    Follow-up Information     Follow up With Specialties Details Why Contact Info    Ghanshyam Mendez MD Internal Medicine   [address] [place] [phone]      [address] Internal Medicine  Call Call to schedule routine 6 week postpartum visit. [address] [phone]                      .

## 2021-05-17 NOTE — LACTATION NOTE
Per mom, infant latching and nursing well. Mom educated on breastfeeding basics--hunger cues, feeding on demand, waking baby if baby sleeps too long between feeds, importance of skin to skin, positioning and latching, risk of pacifier use and supplemental feedings, and importance of rooming in--and use of log sheet. Mom also educated on benefits of breastfeeding for herself and baby. Mom verbalized understanding. No questions at this time. 1130 Infant latched and nursing well with nipple shield--unable to latch without. Encouraged to attempt feedings more frequently due to jaundice. Mom verbalized understanding and no questions at this time.

## 2021-05-18 NOTE — PROGRESS NOTES
Discharge teaching completed on mother and baby. Hugs removed. Arm bands verified. Taken to car with baby in arms via w/c for discharge.